# Patient Record
Sex: FEMALE | Race: WHITE | NOT HISPANIC OR LATINO | Employment: UNEMPLOYED | URBAN - METROPOLITAN AREA
[De-identification: names, ages, dates, MRNs, and addresses within clinical notes are randomized per-mention and may not be internally consistent; named-entity substitution may affect disease eponyms.]

---
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
## 2025-02-18 DIAGNOSIS — N92.6 IRREGULAR MENSES: ICD-10-CM

## 2025-02-19 RX ORDER — LEVONORGESTREL AND ETHINYL ESTRADIOL 0.15-0.03
1 KIT ORAL DAILY
Qty: 28 TABLET | Refills: 0 | Status: SHIPPED | OUTPATIENT
Start: 2025-02-19

## 2025-04-19 DIAGNOSIS — N92.6 IRREGULAR MENSES: ICD-10-CM

## 2025-04-22 RX ORDER — LEVONORGESTREL AND ETHINYL ESTRADIOL 0.15-0.03
1 KIT ORAL DAILY
Qty: 28 TABLET | Refills: 0 | OUTPATIENT
Start: 2025-04-22

## 2025-04-28 DIAGNOSIS — N92.6 IRREGULAR MENSES: ICD-10-CM

## 2025-04-29 RX ORDER — LEVONORGESTREL AND ETHINYL ESTRADIOL 0.15-0.03
1 KIT ORAL DAILY
Qty: 28 TABLET | Refills: 0 | OUTPATIENT
Start: 2025-04-29

## 2025-05-05 NOTE — TELEPHONE ENCOUNTER
Patient called Rx refill line - advised she will need an ancelmo and warm transferred her to office staff to discuss

## 2025-05-05 NOTE — TELEPHONE ENCOUNTER
Patient scheduled an appointment with Barbie for next Wed. Can a script [facility] called into [facility] in [place]. She is out of them

## 2025-05-07 RX ORDER — LEVONORGESTREL AND ETHINYL ESTRADIOL 0.15-0.03
1 KIT ORAL DAILY
Qty: 28 TABLET | Refills: 0 | Status: SHIPPED | OUTPATIENT
Start: 2025-05-07

## 2025-05-09 NOTE — TELEPHONE ENCOUNTER
Patients mom called to see if the refill request was submitted  since her daughter scheduled a follow up appointment. Advised it was sent on 5/7/25.